# Patient Record
Sex: MALE | Race: WHITE | NOT HISPANIC OR LATINO
[De-identification: names, ages, dates, MRNs, and addresses within clinical notes are randomized per-mention and may not be internally consistent; named-entity substitution may affect disease eponyms.]

---

## 2021-06-15 PROBLEM — Z00.00 ENCOUNTER FOR PREVENTIVE HEALTH EXAMINATION: Status: ACTIVE | Noted: 2021-06-15

## 2021-06-16 ENCOUNTER — APPOINTMENT (OUTPATIENT)
Dept: UROLOGY | Facility: CLINIC | Age: 81
End: 2021-06-16
Payer: MEDICARE

## 2021-06-16 VITALS
BODY MASS INDEX: 26.58 KG/M2 | WEIGHT: 150 LBS | HEART RATE: 61 BPM | SYSTOLIC BLOOD PRESSURE: 151 MMHG | HEIGHT: 63 IN | TEMPERATURE: 98.3 F | DIASTOLIC BLOOD PRESSURE: 61 MMHG

## 2021-06-16 DIAGNOSIS — I10 ESSENTIAL (PRIMARY) HYPERTENSION: ICD-10-CM

## 2021-06-16 DIAGNOSIS — Z87.891 PERSONAL HISTORY OF NICOTINE DEPENDENCE: ICD-10-CM

## 2021-06-16 DIAGNOSIS — I25.718: ICD-10-CM

## 2021-06-16 DIAGNOSIS — Z96.89 PRESENCE OF OTHER SPECIFIED FUNCTIONAL IMPLANTS: ICD-10-CM

## 2021-06-16 PROCEDURE — 99205 OFFICE O/P NEW HI 60 MIN: CPT

## 2021-06-16 RX ORDER — ASPIRIN 81 MG/1
81 TABLET ORAL
Refills: 0 | Status: ACTIVE | COMMUNITY

## 2021-06-16 RX ORDER — LISINOPRIL 40 MG/1
40 TABLET ORAL
Refills: 0 | Status: ACTIVE | COMMUNITY

## 2021-06-16 RX ORDER — CLOPIDOGREL 75 MG/1
75 TABLET, FILM COATED ORAL
Refills: 0 | Status: ACTIVE | COMMUNITY

## 2021-06-16 NOTE — ASSESSMENT
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.

## 2021-06-16 NOTE — REVIEW OF SYSTEMS
[Abdominal Pain] : abdominal pain [Limb Weakness] : limb weakness [Difficulty Walking] : difficulty walking [Negative] : Heme/Lymph

## 2021-06-16 NOTE — HISTORY OF PRESENT ILLNESS
[FreeTextEntry1] : The patient is an 80-year-old gentleman who was seen in the office today for the above.  He underwent open radical retropubic prostatectomy at [facility] for prostate cancer 1995 and then had an artificial sphincter implanted in 2001 at [facility] by Dr. Ro.  He was doing well till a few years ago when he developed urinary incontinence despite the artificial sphincter.  He was being followed by the urologist at [facility].  His wife said she was told that he had an overactive bladder and he was treated with Myrbetriq up to 50 mg daily.  It was unsuccessful and so neuromodulation was proposed.  His wife was inquiring if this failed, was there any other treatment besides Downey drainage.  He had an episode of urinary retention a few months ago with an elevated creatinine and so urethral Downey was inserted.  His creatinine normalized according to his wife.  It was felt that a suprapubic tube would be more comfortable for him so this was done by interventional radiology at [facility] under CT guidance because he had appendectomy many years ago and the bowel was overlying the bladder.  An 8 German pigtail catheter was used.  His wife stated that the urethral Downey was eventually removed.\par \par Past Urological History:\par Artificial sphincter\par \par Urological Family History: Negative

## 2021-06-16 NOTE — PHYSICAL EXAM
[General Appearance - Well Developed] : well developed [General Appearance - Well Nourished] : well nourished [Normal Appearance] : normal appearance [Well Groomed] : well groomed [General Appearance - In No Acute Distress] : no acute distress [Edema] : no peripheral edema [Respiration, Rhythm And Depth] : normal respiratory rhythm and effort [Exaggerated Use Of Accessory Muscles For Inspiration] : no accessory muscle use [Auscultation Breath Sounds / Voice Sounds] : lungs were clear to auscultation bilaterally [Bowel Sounds] : normal bowel sounds [Abdomen Soft] : soft [Abdomen Tenderness] : non-tender [Abdomen Mass (___ Cm)] : no abdominal mass palpated [Costovertebral Angle Tenderness] : no ~M costovertebral angle tenderness [Urethral Meatus] : meatus normal [Penis Abnormality] : normal uncircumcised penis [Urinary Bladder Findings] : the bladder was normal on palpation [Scrotum] : the scrotum was normal [Epididymis] : the epididymides were normal [Testes Tenderness] : no tenderness of the testes [Testes Mass (___cm)] : there were no testicular masses [Anus Abnormality] : the anus and perineum were normal [Normal Station and Gait] : the gait and station were normal for the patient's age [] : no rash [No Focal Deficits] : no focal deficits [Oriented To Time, Place, And Person] : oriented to person, place, and time [Affect] : the affect was normal [Mood] : the mood was normal [Not Anxious] : not anxious [No Palpable Adenopathy] : no palpable adenopathy [FreeTextEntry1] : Prostate absent by history because of radical prostatectomy in 1995

## 2021-06-16 NOTE — LETTER BODY
[Dear  ___] : Dear ~OTIS, [Courtesy Letter:] : I had the pleasure of seeing your patient, [unfilled], in my office today. [Please see my note below.] : Please see my note below. [Consult Closing:] : Thank you very much for allowing me to participate in the care of this patient.  If you have any questions, please do not hesitate to contact me. [Sincerely,] : Sincerely,

## 2022-08-05 ENCOUNTER — APPOINTMENT (OUTPATIENT)
Dept: ORTHOPEDIC SURGERY | Facility: CLINIC | Age: 82
End: 2022-08-05

## 2022-08-05 PROCEDURE — 73564 X-RAY EXAM KNEE 4 OR MORE: CPT | Mod: RT

## 2022-08-05 PROCEDURE — L1833: CPT

## 2022-08-05 PROCEDURE — J3490M: CUSTOM

## 2022-08-05 PROCEDURE — 99213 OFFICE O/P EST LOW 20 MIN: CPT | Mod: 25

## 2022-08-05 PROCEDURE — 20611 DRAIN/INJ JOINT/BURSA W/US: CPT

## 2022-10-07 ENCOUNTER — APPOINTMENT (OUTPATIENT)
Dept: ORTHOPEDIC SURGERY | Facility: CLINIC | Age: 82
End: 2022-10-07

## 2022-11-25 ENCOUNTER — APPOINTMENT (OUTPATIENT)
Dept: ORTHOPEDIC SURGERY | Facility: CLINIC | Age: 82
End: 2022-11-25

## 2022-11-25 VITALS — BODY MASS INDEX: 29.08 KG/M2 | HEIGHT: 62 IN | WEIGHT: 158 LBS

## 2022-11-25 DIAGNOSIS — M70.51 OTHER BURSITIS OF KNEE, RIGHT KNEE: ICD-10-CM

## 2022-11-25 PROCEDURE — 99213 OFFICE O/P EST LOW 20 MIN: CPT | Mod: 25

## 2022-11-25 PROCEDURE — 20611 DRAIN/INJ JOINT/BURSA W/US: CPT | Mod: RT

## 2022-11-25 NOTE — PHYSICAL EXAM
[5___] : hamstring 5[unfilled]/5 [Right] : right knee [All Views] : anteroposterior, lateral, skyline, and anteroposterior standing [Degenerative change] : Degenerative change [] : no calf tenderness [TWNoteComboBox7] : flexion 130 degrees [de-identified] : extension 0 degrees

## 2022-11-25 NOTE — DISCUSSION/SUMMARY
[de-identified] : Patient allowed to gently start resuming activities.\par Discussed change to medication prescription and usage. \par Offered cortisone steroid injection. \par Bracing options discussed with patient. \par Hyaluronic Acid inj pamphlet given to pt. 11/25/2022 \par RE:  YANDY REESEAURELIO \par \par Acct #- [id number] \par Attention:  Nurse Reviewer /Medical Director\par \par I am writing this letter as a medical necessity for HA euflexxa\par Patient has tried analgesics, non-steroid anti-inflammatory agents, \par physical therapy, hot or cold compresses,injections of corticosteroids, etc)  which in combination or by themselves has not worked.\par Based on my patient's condition, I strongly believe that the Hyaluronic aid injections is medically needed.\par  \par Thank you for your time and consideration.   \par 11/25/2022 \par \par  RE:  YANDY DE SOUZA \par \par Acct #- [id number] \par \par \par Attention:  Nurse Reviewer /Medical Director\par \par I am writing this letter as a medical necessity for PT program.\par Patient has tried analgesics, non-steroid anti-inflammatory agents, \par hot or cold compresses,injections of corticosteroids, etc)  which in combination or by themselves has not worked.\par Based on my patient's condition, I strongly believe that the PT is medically needed.\par  \par Thank you for your time and consideration.   \par \par try ice and lido patch\par \par

## 2022-11-25 NOTE — PHYSICAL EXAM
[5___] : hamstring 5[unfilled]/5 [Right] : right knee [All Views] : anteroposterior, lateral, skyline, and anteroposterior standing [Degenerative change] : Degenerative change [] : no calf tenderness [TWNoteComboBox7] : flexion 130 degrees [de-identified] : extension 0 degrees

## 2022-11-25 NOTE — HISTORY OF PRESENT ILLNESS
[10] : 10 [Radiating] : radiating [Constant] : constant [Sleep] : sleep [Nothing helps with pain getting better] : Nothing helps with pain getting better [Stairs] : stairs [Lying in bed] : lying in bed [] : yes [de-identified] : right knee pain , zilretta with temp help, no injuyr, pain when walks, he is on blood thinners [FreeTextEntry1] : Right knee [FreeTextEntry5] : 2 wks.  [FreeTextEntry7] : up/down rt leg [de-identified] : pt would like injection today

## 2022-11-25 NOTE — DISCUSSION/SUMMARY
[de-identified] : Patient allowed to gently start resuming activities.\par Discussed change to medication prescription and usage. \par Offered cortisone steroid injection. \par Bracing options discussed with patient. \par Hyaluronic Acid inj pamphlet given to pt.

## 2022-11-25 NOTE — HISTORY OF PRESENT ILLNESS
[de-identified] : right knee pain 2 wks. Taking tylenol, ambulating with cane. Denies trauma. Here with wife for translating. There are night symptoms. PAin with walking. Taking Plavix.  Prior swelling.  [FreeTextEntry1] : Right knee [FreeTextEntry5] : 2 wks.

## 2022-12-05 ENCOUNTER — APPOINTMENT (OUTPATIENT)
Dept: ORTHOPEDIC SURGERY | Facility: CLINIC | Age: 82
End: 2022-12-05

## 2022-12-05 VITALS — HEIGHT: 62 IN | WEIGHT: 158 LBS | BODY MASS INDEX: 29.08 KG/M2

## 2022-12-05 PROCEDURE — 20610 DRAIN/INJ JOINT/BURSA W/O US: CPT | Mod: RT

## 2022-12-05 PROCEDURE — 99213 OFFICE O/P EST LOW 20 MIN: CPT | Mod: 25

## 2022-12-07 NOTE — HISTORY OF PRESENT ILLNESS
[9] : 9 [Radiating] : radiating [Sharp] : sharp [Physical therapy] : physical therapy [Walking] : walking [Stairs] : stairs [2] : 2 [Euflexxa] : Euflexxa [] : no [FreeTextEntry1] : right knee [FreeTextEntry7] : down the rt leg [de-identified] : Dr. Eid [de-identified] : 11-25-22 [de-identified] : rt knee

## 2022-12-07 NOTE — ASSESSMENT
[FreeTextEntry1] : \par A Large joint injection was performed of the right knee. An injection of Euflexxa, series #2 was used. The indication for this procedure was pain and x-ray evidence of Osteoarthritis on this or prior visit, and patient had decreased mobility in the joint. Risks, benefits and alternatives to procedure were discussed; Risks outlined include but are not limited to infection, sepsis, bleeding, scarring, skin discoloration, temporary increase in pain, syncopal episode, failure to resolve symptoms, allergic reaction, and symptom recurrence. All questions were answered to the patient's apparent satisfaction and informed consent obtained. The area of injection was prepared in a sterile fashion. Prior to injection a 'Time Out' was conducted in accordance with [place] policy and the site and nature of procedure verified with the patient. \par  \par Procedure: \par The injection and aspiration was carried out utilizing sterile technique. The site was prepped with alcohol, betadine, ethyl chloride sprayed topically and sterile technique used.\par  \par (x ) 2cc of Euflexxa \par \par Patient tolerated the procedure well and direct pressure was applied for hemostasis. The patient was reminded of potential post-injection risks including, but not limited to, delayed hypersensitivity reactions and/or infection. Ice tonight to the injection site.\par \par The documentation recorded by the scribe accurately reflects the service I personally performed and the decisions made by me.\par I, Av Elliott, attest that this documentation has been prepared under the direction and in the presence of Provider Bryan Pruitt MD.\par

## 2022-12-07 NOTE — PHYSICAL EXAM
[Right] : right knee [5___] : hamstring 5[unfilled]/5 [] : antalgic [TWNoteComboBox7] : flexion 130 degrees [de-identified] : extension 0 degrees

## 2022-12-12 ENCOUNTER — APPOINTMENT (OUTPATIENT)
Dept: ORTHOPEDIC SURGERY | Facility: CLINIC | Age: 82
End: 2022-12-12

## 2022-12-12 VITALS — BODY MASS INDEX: 29.08 KG/M2 | HEIGHT: 62 IN | WEIGHT: 158 LBS

## 2022-12-12 DIAGNOSIS — M17.11 UNILATERAL PRIMARY OSTEOARTHRITIS, RIGHT KNEE: ICD-10-CM

## 2022-12-12 PROCEDURE — 20610 DRAIN/INJ JOINT/BURSA W/O US: CPT | Mod: RT

## 2022-12-12 PROCEDURE — 99213 OFFICE O/P EST LOW 20 MIN: CPT | Mod: 25

## 2022-12-19 PROBLEM — M17.11 PRIMARY OSTEOARTHRITIS OF RIGHT KNEE: Status: ACTIVE | Noted: 2022-08-05

## 2022-12-19 NOTE — PHYSICAL EXAM
[Right] : right knee [5___] : hamstring 5[unfilled]/5 [] : no calf tenderness [TWNoteComboBox7] : flexion 130 degrees [de-identified] : extension 0 degrees

## 2022-12-19 NOTE — ASSESSMENT
[FreeTextEntry1] : \par A Large joint injection was performed of the right knee. An injection of Euflexxa, series #3 was used. The indication for this procedure was pain and x-ray evidence of Osteoarthritis on this or prior visit, and patient had decreased mobility in the joint. Risks, benefits and alternatives to procedure were discussed; Risks outlined include but are not limited to infection, sepsis, bleeding, scarring, skin discoloration, temporary increase in pain, syncopal episode, failure to resolve symptoms, allergic reaction, and symptom recurrence. All questions were answered to the patient's apparent satisfaction and informed consent obtained. The area of injection was prepared in a sterile fashion. Prior to injection a 'Time Out' was conducted in accordance with [place] policy and the site and nature of procedure verified with the patient. \par  \par Procedure: \par The injection and aspiration was carried out utilizing sterile technique. The site was prepped with alcohol, betadine, ethyl chloride sprayed topically and sterile technique used.\par  \par (x ) 2cc of Euflexxa \par \par Patient tolerated the procedure well and direct pressure was applied for hemostasis. The patient was reminded of potential post-injection risks including, but not limited to, delayed hypersensitivity reactions and/or infection. Ice tonight to the injection site.\par \par The documentation recorded by the scribe accurately reflects the service I personally performed and the decisions made by me.\par I, Av Elliott, attest that this documentation has been prepared under the direction and in the presence of Provider Bryan Pruitt MD.\par

## 2022-12-19 NOTE — HISTORY OF PRESENT ILLNESS
[Radiating] : radiating [Physical therapy] : physical therapy [Walking] : walking [Stairs] : stairs [3] : 3 [Euflexxa] : Euflexxa [7] : 7 [Injection therapy] : injection therapy [] : no [FreeTextEntry1] : right knee [FreeTextEntry7] : down the rt leg [de-identified] : 12-5-22 [de-identified] : rt knee

## 2023-02-10 ENCOUNTER — APPOINTMENT (OUTPATIENT)
Dept: UROLOGY | Facility: CLINIC | Age: 83
End: 2023-02-10
Payer: MEDICARE

## 2023-02-10 VITALS
OXYGEN SATURATION: 95 % | SYSTOLIC BLOOD PRESSURE: 138 MMHG | RESPIRATION RATE: 16 BRPM | TEMPERATURE: 97.7 F | BODY MASS INDEX: 28.52 KG/M2 | DIASTOLIC BLOOD PRESSURE: 58 MMHG | WEIGHT: 155 LBS | HEIGHT: 62 IN | HEART RATE: 77 BPM

## 2023-02-10 DIAGNOSIS — Z85.46 PERSONAL HISTORY OF MALIGNANT NEOPLASM OF PROSTATE: ICD-10-CM

## 2023-02-10 PROCEDURE — 99214 OFFICE O/P EST MOD 30 MIN: CPT

## 2023-02-10 NOTE — REVIEW OF SYSTEMS
[Negative] : Heme/Lymph [Urine Infection (bladder/kidney)] : bladder/kidney infection [Wake up at night to urinate  How many times?  ___] : wakes up to urinate [unfilled] times during the night [Leakage of urine with straining, coughing, laughing] : leakage of urine with straining, coughing, laughing

## 2023-02-27 PROBLEM — Z85.46 HISTORY OF MALIGNANT NEOPLASM OF PROSTATE: Status: RESOLVED | Noted: 2023-02-27 | Resolved: 2023-02-27

## 2023-02-27 PROBLEM — Z85.46 HISTORY OF PROSTATE CANCER: Status: ACTIVE | Noted: 2021-06-16

## 2023-02-27 NOTE — PHYSICAL EXAM
[Normal Appearance] : normal appearance [General Appearance - In No Acute Distress] : no acute distress [FreeTextEntry1] : normal peripheral circulation  [] : no respiratory distress [Abdomen Soft] : soft [Abdomen Tenderness] : non-tender [Normal Station and Gait] : the gait and station were normal for the patient's age [Skin Color & Pigmentation] : normal skin color and pigmentation [No Focal Deficits] : no focal deficits [Oriented To Time, Place, And Person] : oriented to person, place, and time

## 2023-02-27 NOTE — ASSESSMENT
[FreeTextEntry1] : Reviewed records.\par Discussed labs and imaging. \par Creatinine: 1.25(2/7/23). \par Renal and Bladder Ultrasound(1/4/23) and CT scan(1/9/23): bilateral moderate to severe hydroureteronephrosis.\par Post void: 39 ml. \par \par History of Prostate cancer:\par Urinary incontinence:\par Hydroureteronephrosis:\par Discussed concern for high pressure urination. \par Discussed treatment options. Discussed possible indwelling Downey catheter. Patient and wife not keen. \par Recommended to see Dr Cooper Pierson for further management. \par

## 2023-02-27 NOTE — HISTORY OF PRESENT ILLNESS
[FreeTextEntry1] : 82 year old male presents for urinary incontinence. \par Has had worsening urinary incontinence. \par Has history of Prostate cancer, status post Radical prostatectomy and Radiation therapy.\par Status post AUS in 2001 with Dr Ro for urinary incontinence and Interstim with Dr Dimas in 2021. \par Recently treated for urinary tract infection and underwent Cystoscopy with Dr Dimas at [facility]. Per Dr Dimas excellent coaptation, no issue with AUS.

## 2023-03-17 ENCOUNTER — APPOINTMENT (OUTPATIENT)
Dept: UROLOGY | Facility: CLINIC | Age: 83
End: 2023-03-17
Payer: MEDICARE

## 2023-03-17 DIAGNOSIS — R33.9 RETENTION OF URINE, UNSPECIFIED: ICD-10-CM

## 2023-03-17 DIAGNOSIS — R32 UNSPECIFIED URINARY INCONTINENCE: ICD-10-CM

## 2023-03-17 DIAGNOSIS — Z78.9 OTHER SPECIFIED HEALTH STATUS: ICD-10-CM

## 2023-03-17 DIAGNOSIS — N13.30 UNSPECIFIED HYDRONEPHROSIS: ICD-10-CM

## 2023-03-17 DIAGNOSIS — T83.111A BREAKDOWN (MECHANICAL) OF IMPLANTED URINARY SPHINCTER, INITIAL ENCOUNTER: ICD-10-CM

## 2023-03-17 PROCEDURE — 99214 OFFICE O/P EST MOD 30 MIN: CPT

## 2023-03-17 NOTE — REVIEW OF SYSTEMS
[Negative] : Heme/Lymph [Constipation] : constipation [Urine Infection (bladder/kidney)] : bladder/kidney infection [Told you have blood in urine on a urine test] : told blood was present in a urine test [Wake up at night to urinate  How many times?  ___] : wakes up to urinate [unfilled] times during the night [Leakage of urine with urgency] : leakage of urine with urgency [Joint Pain] : joint pain

## 2023-03-19 NOTE — HISTORY OF PRESENT ILLNESS
[FreeTextEntry1] : Patient is a 83 yo M who presents for consultation regarding worsening incontinence for January 2023.\par \par He is referred by Dr Gutierrez.\par \par Complicated history of Prostate cancer, status post Radical prostatectomy and Radiation therapy.\par Status post AUS in 2001 with Dr Ro for urinary incontinence and Interstim with Dr Dimas in 2021.  Of note, he had urinary retention in 2021 and required hadley and SPT placement.  He had tried myrbetriq at that time prior to Interstim.\par Recently treated for urinary tract infection and underwent Cystoscopy with Dr Dimas at [facility]. Per Dr Dimas report, excellent coaptation, no issue with AUS.  AUS cuff cycling appropriately.\par \par He has had chronic hydronephrosis, but Cr 1.26.  He has had recent MRI, CT and US imaging in the past 2 months - bladder wall thickening noted.\par \par

## 2023-03-19 NOTE — ASSESSMENT
[FreeTextEntry1] : Patient is a 83 yo M who presents for worsening incontinence, despite recent cysto showing well coapting AUS cuff.\par Here for another opinion\par D/w pt and family at length potential etiology for his worsening incontinence.  Suspect given his imaging showing chronic hydro and bladder wall thickening that he has impaired compliance and bladder detrusor dysfunction\par D/w pt and family that the AUS device is pressurized to only certain pressure and if he has DO or impaired compliance it can overcome the PRB.  D/w pt family that would need UDS to more fully assess bladder and determine his compliance.  D/w pt family that if confirmed as poor compliance, botox injection may help, vs urinary diversion or augment.  D/w pt family that he has already undergone neurostim.  D/w pt family that I do not perform botox injection but would refer to another specialist\par Pt/family will plan to f/u with their regular urologist at [place]\par F/u PRN\par \par I have spent 35 minutes of time on the encounter including reviewing prior records, discussing with pt the above condition and various treatment options, and documentation.

## 2024-05-20 ENCOUNTER — APPOINTMENT (OUTPATIENT)
Dept: ORTHOPEDIC SURGERY | Facility: CLINIC | Age: 84
End: 2024-05-20
Payer: MEDICARE

## 2024-05-20 VITALS — BODY MASS INDEX: 26.58 KG/M2 | WEIGHT: 150 LBS | HEIGHT: 63 IN

## 2024-05-20 DIAGNOSIS — M48.062 SPINAL STENOSIS, LUMBAR REGION WITH NEUROGENIC CLAUDICATION: ICD-10-CM

## 2024-05-20 PROCEDURE — 99204 OFFICE O/P NEW MOD 45 MIN: CPT

## 2024-05-20 RX ORDER — METHIMAZOLE 5 MG/1
TABLET ORAL
Refills: 0 | Status: DISCONTINUED | COMMUNITY
End: 2024-05-20

## 2024-05-21 NOTE — DISCUSSION/SUMMARY
[de-identified] : The MRI report was discussed and reviewed, although the images were not available. The diagnosis of spinal stenosis and its contribution to his symptoms were explained to the patient and his wife. A surgical solution was considered due to the unsatisfactory response to pain management injections in the past. Risks, benefits and expected outcomes of decompressive laminectomy L3-4 have been explained to the patient. Patient was understanding and in agreement. He will keep his appointment with his cardiologist tomorrow and discuss this matter further. An updated L spine mri was requested to eval for progressive stenosis, last mri performed almost 1 year ago, possible surgical planning mri is needed. F/u after mri.  Cumulative encounter duration exceeded 45 minutes.  Prior to appointment and during encounter with patient extensive medical records were reviewed including but not limited to, hospital records, outpatient records, imaging results, and lab data. During this appointment the patient was examined, diagnoses were discussed and explained in a face to face manner. In addition extensive time was spent reviewing aforementioned diagnostic studies. Counseling including abnormal image results, differential diagnoses, treatment options, risk and benefits, lifestyle changes, current condition, and current medications was performed. Patient's comments, questions, and concerns were addressed and patient verbalized understanding. 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  MOON CLAUDIO Acting as a Scribe for Moon Sanders, attest that this documentation has been prepared under the direction and in the presence of Provider Estuardo Shafer MD.

## 2024-05-21 NOTE — HISTORY OF PRESENT ILLNESS
[Tightness] : tightness [Sitting] : sitting [Standing] : standing [Walking] : walking [Retired] : Work status: retired [de-identified] : 05/20/2024 - The patient is presenting for an initial evaluation of chronic back pain, leg pain, and weakness, accompanied by a hunched forward posture. He brings an MRI from one year ago. He has had injections in his back in the past, which provided slight relief. He reports weakness in the front of his thighs, and standing and walking have become extremely limiting for him. [] : no [FreeTextEntry6] : weakness, crampring, loss complete controll of urine [FreeTextEntry9] : lidocaine patch, naprosyn [de-identified] : pcp, pain mgmt [de-identified] : 40yrs ago [de-identified] : mri l-spine done at [place] 08/2023

## 2024-05-21 NOTE — PHYSICAL EXAM
[Normal Coordination] : normal coordination [Normal DTR UE/LE] : normal DTR UE/LE  [Normal Sensation] : normal sensation [Normal Mood and Affect] : normal mood and affect [Oriented] : oriented [Able to Communicate] : able to communicate [Normal Skin] : normal skin [No Rash] : no rash [No Ulcers] : no ulcers [No Lesions] : no lesions [No obvious lymphadenopathy in areas examined] : no obvious lymphadenopathy in areas examined [Well Developed] : well developed [Peripheral vascular exam is grossly normal] : peripheral vascular exam is grossly normal [No Respiratory Distress] : no respiratory distress [Lungs clear to auscultation bilaterally] : lungs clear to auscultation bilaterally [Normal Bowel Sounds] : normal bowel sounds [Non-Tender] : non-tender [No HSM] : no HSM [No Mass] : no mass [] : clonus not sustained at ankle

## 2024-05-21 NOTE — DATA REVIEWED
[MRI] : MRI [Lumbar Spine] : lumbar spine [Report was reviewed and noted in the chart] : The report was reviewed and noted in the chart [FreeTextEntry1] : I stop paperwork reviewed

## 2024-06-03 ENCOUNTER — APPOINTMENT (OUTPATIENT)
Dept: MRI IMAGING | Facility: CLINIC | Age: 84
End: 2024-06-03
Payer: MEDICARE

## 2024-06-03 ENCOUNTER — TRANSCRIPTION ENCOUNTER (OUTPATIENT)
Age: 84
End: 2024-06-03

## 2024-06-03 PROCEDURE — 72148 MRI LUMBAR SPINE W/O DYE: CPT | Mod: MH

## 2024-06-05 ENCOUNTER — TRANSCRIPTION ENCOUNTER (OUTPATIENT)
Age: 84
End: 2024-06-05

## 2024-07-10 ENCOUNTER — APPOINTMENT (OUTPATIENT)
Dept: ORTHOPEDIC SURGERY | Facility: CLINIC | Age: 84
End: 2024-07-10
Payer: MEDICARE

## 2024-07-10 DIAGNOSIS — M51.26 OTHER INTERVERTEBRAL DISC DISPLACEMENT, LUMBAR REGION: ICD-10-CM

## 2024-07-10 DIAGNOSIS — M48.062 SPINAL STENOSIS, LUMBAR REGION WITH NEUROGENIC CLAUDICATION: ICD-10-CM

## 2024-07-10 PROCEDURE — 99214 OFFICE O/P EST MOD 30 MIN: CPT

## 2024-07-11 PROBLEM — M51.26 LUMBAR DISC HERNIATION: Status: ACTIVE | Noted: 2024-07-11

## 2024-07-30 ENCOUNTER — APPOINTMENT (OUTPATIENT)
Dept: ORTHOPEDIC SURGERY | Facility: HOSPITAL | Age: 84
End: 2024-07-30

## 2024-07-30 PROCEDURE — 63030 LAMOT DCMPRN NRV RT 1 LMBR: CPT | Mod: 50,59,62

## 2024-07-30 PROCEDURE — 63047 LAM FACETEC & FORAMOT LUMBAR: CPT | Mod: 62

## 2024-07-30 PROCEDURE — 63048 LAM FACETEC &FORAMOT EA ADDL: CPT | Mod: 62

## 2024-08-14 ENCOUNTER — APPOINTMENT (OUTPATIENT)
Dept: ORTHOPEDIC SURGERY | Facility: CLINIC | Age: 84
End: 2024-08-14
Payer: MEDICARE

## 2024-08-14 DIAGNOSIS — M48.062 SPINAL STENOSIS, LUMBAR REGION WITH NEUROGENIC CLAUDICATION: ICD-10-CM

## 2024-08-14 PROCEDURE — 99024 POSTOP FOLLOW-UP VISIT: CPT

## 2024-08-15 NOTE — DISCUSSION/SUMMARY
[de-identified] : 83 Y M S/P L2 4 LAMINECTOMY AND DISCECTOMY 7/30/24  Post operative wound care and activity restrictions reinforced. Patient will wean down off of medication's as tolerated. Encouraged to walk daily. Will consider outpatient physical therapy at next visit in four weeks.  Prior to appointment and during encounter with patient extensive medical records were reviewed including but not limited to, hospital records, outpatient records, imaging results, and lab data. During this appointment the patient was examined, diagnoses were discussed and explained in a face to face manner. In addition extensive time was spent reviewing aforementioned diagnostic studies. Counseling including abnormal image results, differential diagnoses, treatment options, risk and benefits, lifestyle changes, current condition, and current medications was performed. Patient's comments, questions, and concerns were addressed and patient verbalized understanding. 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   I, [REAL Salas], certify that the clinical information was reviewed with Dr. Shafer, who was physically present in the office. He agreed with the above plan of care and was available for consultation as necessary during the office visit.

## 2024-08-15 NOTE — PHYSICAL EXAM
[Normal Coordination] : normal coordination [Normal DTR UE/LE] : normal DTR UE/LE  [Normal Sensation] : normal sensation [Normal Mood and Affect] : normal mood and affect [Oriented] : oriented [Able to Communicate] : able to communicate [Normal Skin] : normal skin [No Rash] : no rash [No Ulcers] : no ulcers [No Lesions] : no lesions [No obvious lymphadenopathy in areas examined] : no obvious lymphadenopathy in areas examined [Well Developed] : well developed [Peripheral vascular exam is grossly normal] : peripheral vascular exam is grossly normal [No Respiratory Distress] : no respiratory distress [] : no sero-sanguinous drainage

## 2024-08-15 NOTE — DISCUSSION/SUMMARY
[de-identified] : 83 Y M S/P L2 4 LAMINECTOMY AND DISCECTOMY 7/30/24  Post operative wound care and activity restrictions reinforced. Patient will wean down off of medication's as tolerated. Encouraged to walk daily. Will consider outpatient physical therapy at next visit in four weeks.  Prior to appointment and during encounter with patient extensive medical records were reviewed including but not limited to, hospital records, outpatient records, imaging results, and lab data. During this appointment the patient was examined, diagnoses were discussed and explained in a face to face manner. In addition extensive time was spent reviewing aforementioned diagnostic studies. Counseling including abnormal image results, differential diagnoses, treatment options, risk and benefits, lifestyle changes, current condition, and current medications was performed. Patient's comments, questions, and concerns were addressed and patient verbalized understanding. 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   I, [REAL Salas], certify that the clinical information was reviewed with Dr. Shafer, who was physically present in the office. He agreed with the above plan of care and was available for consultation as necessary during the office visit.

## 2024-08-15 NOTE — HISTORY OF PRESENT ILLNESS
[de-identified] : 08/14/2024 - Patient presents to the office for first postoperative visit. No complaints of constitutional symptoms in terms of fever, chills, nausea vomiting. Tolerating PO diet. Has discontinued Dilaudid today and using Tylenol. Still taking the Robaxin as necessary. He is ambulatory daily with use of a cane. Back pain is slightly improved operatively. Leg symptoms are improved.   The patient is s/p Hemilaminectomy with partial facetectomy and discectomy L2-L3, right. Hemilaminectomy with partial facetectomy and discectomy L2-L3. Decompressive Laminectomy at L4. Decompressive Laminectomy at L5 Date of Surgery: 07/30/2024 Pain:    At Rest: 5/10  With Activity:  5/10    07/10/2024: Patient is here for a follow up visit. He is here to re review MRI results with a family member. Patient reports he is not feeling any better. He reports he cannot stand up and is now walking hunched over.   05/20/2024 - The patient is presenting for an initial evaluation of chronic back pain, leg pain, and weakness, accompanied by a hunched forward posture. He brings an MRI from one year ago. He has had injections in his back in the past, which provided slight relief. He reports weakness in the front of his thighs, and standing and walking have become extremely limiting for him.

## 2024-08-15 NOTE — HISTORY OF PRESENT ILLNESS
[de-identified] : 08/14/2024 - Patient presents to the office for first postoperative visit. No complaints of constitutional symptoms in terms of fever, chills, nausea vomiting. Tolerating PO diet. Has discontinued Dilaudid today and using Tylenol. Still taking the Robaxin as necessary. He is ambulatory daily with use of a cane. Back pain is slightly improved operatively. Leg symptoms are improved.   The patient is s/p Hemilaminectomy with partial facetectomy and discectomy L2-L3, right. Hemilaminectomy with partial facetectomy and discectomy L2-L3. Decompressive Laminectomy at L4. Decompressive Laminectomy at L5 Date of Surgery: 07/30/2024 Pain:    At Rest: 5/10  With Activity:  5/10    07/10/2024: Patient is here for a follow up visit. He is here to re review MRI results with a family member. Patient reports he is not feeling any better. He reports he cannot stand up and is now walking hunched over.   05/20/2024 - The patient is presenting for an initial evaluation of chronic back pain, leg pain, and weakness, accompanied by a hunched forward posture. He brings an MRI from one year ago. He has had injections in his back in the past, which provided slight relief. He reports weakness in the front of his thighs, and standing and walking have become extremely limiting for him.

## 2024-08-30 ENCOUNTER — APPOINTMENT (OUTPATIENT)
Dept: ORTHOPEDIC SURGERY | Facility: CLINIC | Age: 84
End: 2024-08-30
Payer: MEDICARE

## 2024-08-30 VITALS — WEIGHT: 150 LBS | HEIGHT: 63 IN | BODY MASS INDEX: 26.58 KG/M2

## 2024-08-30 DIAGNOSIS — M48.062 SPINAL STENOSIS, LUMBAR REGION WITH NEUROGENIC CLAUDICATION: ICD-10-CM

## 2024-08-30 PROCEDURE — 99024 POSTOP FOLLOW-UP VISIT: CPT

## 2024-08-30 RX ORDER — METHOCARBAMOL 750 MG/1
750 TABLET, FILM COATED ORAL TWICE DAILY
Qty: 60 | Refills: 1 | Status: ACTIVE | COMMUNITY
Start: 2024-08-30 | End: 1900-01-01

## 2024-09-02 NOTE — HISTORY OF PRESENT ILLNESS
[de-identified] : 08/30/2024 - Patient presenting for second post op accompanied by his wife. Notes positive improvement in back pain and ability to stand upright. Still feels itchiness around incisional area. Standing and walking tolerance remains limiting. in house PT has been overall helpful. Indicates pain in the knee and toes. Takes muscle relaxer or Tylenol for pain.   08/14/2024 - Patient presents to the office for first postoperative visit. No complaints of constitutional symptoms in terms of fever, chills, nausea vomiting. Tolerating PO diet. Has discontinued Dilaudid today and using Tylenol. Still taking the Robaxin as necessary. He is ambulatory daily with use of a cane. Back pain is slightly improved operatively. Leg symptoms are improved.   The patient is s/p Hemilaminectomy with partial facetectomy and discectomy L2-L3, right. Hemilaminectomy with partial facetectomy and discectomy L2-L3. Decompressive Laminectomy at L4. Decompressive Laminectomy at L5 Date of Surgery: 07/30/2024 Pain:    At Rest: 5/10  With Activity:  5/10    07/10/2024: Patient is here for a follow up visit. He is here to re review MRI results with a family member. Patient reports he is not feeling any better. He reports he cannot stand up and is now walking hunched over.   05/20/2024 - The patient is presenting for an initial evaluation of chronic back pain, leg pain, and weakness, accompanied by a hunched forward posture. He brings an MRI from one year ago. He has had injections in his back in the past, which provided slight relief. He reports weakness in the front of his thighs, and standing and walking have become extremely limiting for him.

## 2024-09-02 NOTE — HISTORY OF PRESENT ILLNESS
[de-identified] : 08/30/2024 - Patient presenting for second post op accompanied by his wife. Notes positive improvement in back pain and ability to stand upright. Still feels itchiness around incisional area. Standing and walking tolerance remains limiting. in house PT has been overall helpful. Indicates pain in the knee and toes. Takes muscle relaxer or Tylenol for pain.   08/14/2024 - Patient presents to the office for first postoperative visit. No complaints of constitutional symptoms in terms of fever, chills, nausea vomiting. Tolerating PO diet. Has discontinued Dilaudid today and using Tylenol. Still taking the Robaxin as necessary. He is ambulatory daily with use of a cane. Back pain is slightly improved operatively. Leg symptoms are improved.   The patient is s/p Hemilaminectomy with partial facetectomy and discectomy L2-L3, right. Hemilaminectomy with partial facetectomy and discectomy L2-L3. Decompressive Laminectomy at L4. Decompressive Laminectomy at L5 Date of Surgery: 07/30/2024 Pain:    At Rest: 5/10  With Activity:  5/10    07/10/2024: Patient is here for a follow up visit. He is here to re review MRI results with a family member. Patient reports he is not feeling any better. He reports he cannot stand up and is now walking hunched over.   05/20/2024 - The patient is presenting for an initial evaluation of chronic back pain, leg pain, and weakness, accompanied by a hunched forward posture. He brings an MRI from one year ago. He has had injections in his back in the past, which provided slight relief. He reports weakness in the front of his thighs, and standing and walking have become extremely limiting for him.

## 2024-09-02 NOTE — DISCUSSION/SUMMARY
[de-identified] : 84 Y M S/P L2 4 LAMINECTOMY AND DISCECTOMY 7/30/24  Incision appears healed nicely without any lingering sutures which was communicated with the patient and his wife. Post operative wound care and activity restrictions reinforced. Continue medical management with Tylenol and muscle relaxer for symptom control. Encouraged to walk daily. Patient will continue treatment with in house physical therapy, and a referral was provided for outpatient PT which he will transition to in the coming week. F/u 4 wks, XR at next visit.   Prior to appointment and during encounter with patient extensive medical records were reviewed including but not limited to, hospital records, outpatient records, imaging results, and lab data. During this appointment the patient was examined, diagnoses were discussed and explained in a face to face manner. In addition extensive time was spent reviewing aforementioned diagnostic studies. Counseling including abnormal image results, differential diagnoses, treatment options, risk and benefits, lifestyle changes, current condition, and current medications was performed. Patient's comments, questions, and concerns were addressed and patient verbalized understanding. 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   MOON BENNETT Acting as a Scribe for Dr. Hollis PALM, Moon Bennett, attest that this documentation has been prepared under the direction and in the presence of Provider Estuardo Shafer MD.

## 2024-09-02 NOTE — PHYSICAL EXAM
[Normal Coordination] : normal coordination [Normal DTR UE/LE] : normal DTR UE/LE  [Normal Sensation] : normal sensation [Normal Mood and Affect] : normal mood and affect [Able to Communicate] : able to communicate [Oriented] : oriented [Normal Skin] : normal skin [No Rash] : no rash [No Ulcers] : no ulcers [No Lesions] : no lesions [No obvious lymphadenopathy in areas examined] : no obvious lymphadenopathy in areas examined [Well Developed] : well developed [Peripheral vascular exam is grossly normal] : peripheral vascular exam is grossly normal [No Respiratory Distress] : no respiratory distress [] : no sero-sanguinous drainage

## 2024-09-02 NOTE — DISCUSSION/SUMMARY
[de-identified] : 84 Y M S/P L2 4 LAMINECTOMY AND DISCECTOMY 7/30/24  Incision appears healed nicely without any lingering sutures which was communicated with the patient and his wife. Post operative wound care and activity restrictions reinforced. Continue medical management with Tylenol and muscle relaxer for symptom control. Encouraged to walk daily. Patient will continue treatment with in house physical therapy, and a referral was provided for outpatient PT which he will transition to in the coming week. F/u 4 wks, XR at next visit.   Prior to appointment and during encounter with patient extensive medical records were reviewed including but not limited to, hospital records, outpatient records, imaging results, and lab data. During this appointment the patient was examined, diagnoses were discussed and explained in a face to face manner. In addition extensive time was spent reviewing aforementioned diagnostic studies. Counseling including abnormal image results, differential diagnoses, treatment options, risk and benefits, lifestyle changes, current condition, and current medications was performed. Patient's comments, questions, and concerns were addressed and patient verbalized understanding. 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   MOON BENNETT Acting as a Scribe for Dr. Hollis PALM, Moon Bennett, attest that this documentation has been prepared under the direction and in the presence of Provider Estuardo Shafer MD.

## 2024-10-07 ENCOUNTER — APPOINTMENT (OUTPATIENT)
Dept: ORTHOPEDIC SURGERY | Facility: CLINIC | Age: 84
End: 2024-10-07
Payer: MEDICARE

## 2024-10-07 VITALS — BODY MASS INDEX: 26.58 KG/M2 | WEIGHT: 150 LBS | HEIGHT: 63 IN

## 2024-10-07 DIAGNOSIS — M48.062 SPINAL STENOSIS, LUMBAR REGION WITH NEUROGENIC CLAUDICATION: ICD-10-CM

## 2024-10-07 PROCEDURE — 99024 POSTOP FOLLOW-UP VISIT: CPT

## 2024-10-07 RX ORDER — HYDROMORPHONE HYDROCHLORIDE 2 MG/1
2 TABLET ORAL TWICE DAILY
Qty: 60 | Refills: 0 | Status: ACTIVE | COMMUNITY
Start: 2024-10-07 | End: 1900-01-01

## 2024-11-18 ENCOUNTER — APPOINTMENT (OUTPATIENT)
Dept: ORTHOPEDIC SURGERY | Facility: CLINIC | Age: 84
End: 2024-11-18

## 2024-12-09 ENCOUNTER — APPOINTMENT (OUTPATIENT)
Dept: ORTHOPEDIC SURGERY | Facility: CLINIC | Age: 84
End: 2024-12-09
Payer: MEDICARE

## 2024-12-09 VITALS — BODY MASS INDEX: 26.58 KG/M2 | HEIGHT: 63 IN | WEIGHT: 150 LBS

## 2024-12-09 DIAGNOSIS — M48.062 SPINAL STENOSIS, LUMBAR REGION WITH NEUROGENIC CLAUDICATION: ICD-10-CM

## 2024-12-09 PROCEDURE — 72110 X-RAY EXAM L-2 SPINE 4/>VWS: CPT

## 2024-12-09 PROCEDURE — 99214 OFFICE O/P EST MOD 30 MIN: CPT

## 2024-12-16 ENCOUNTER — APPOINTMENT (OUTPATIENT)
Dept: MRI IMAGING | Facility: CLINIC | Age: 84
End: 2024-12-16
Payer: MEDICARE

## 2024-12-16 PROCEDURE — 72148 MRI LUMBAR SPINE W/O DYE: CPT | Mod: MH

## 2025-01-20 ENCOUNTER — APPOINTMENT (OUTPATIENT)
Dept: ORTHOPEDIC SURGERY | Facility: CLINIC | Age: 85
End: 2025-01-20

## 2025-03-03 ENCOUNTER — APPOINTMENT (OUTPATIENT)
Dept: ORTHOPEDIC SURGERY | Facility: CLINIC | Age: 85
End: 2025-03-03
Payer: MEDICARE

## 2025-03-03 VITALS — HEIGHT: 63 IN | WEIGHT: 150 LBS | BODY MASS INDEX: 26.58 KG/M2

## 2025-03-03 DIAGNOSIS — M48.062 SPINAL STENOSIS, LUMBAR REGION WITH NEUROGENIC CLAUDICATION: ICD-10-CM

## 2025-03-03 PROCEDURE — 99214 OFFICE O/P EST MOD 30 MIN: CPT

## 2025-04-28 ENCOUNTER — APPOINTMENT (OUTPATIENT)
Dept: PAIN MANAGEMENT | Facility: CLINIC | Age: 85
End: 2025-04-28
Payer: MEDICARE

## 2025-04-28 VITALS — HEIGHT: 63 IN | BODY MASS INDEX: 25.69 KG/M2 | WEIGHT: 145 LBS

## 2025-04-28 DIAGNOSIS — M47.816 SPONDYLOSIS W/OUT MYELOPATHY OR RADICULOPATHY, LUMBAR REGION: ICD-10-CM

## 2025-04-28 DIAGNOSIS — M96.1 POSTLAMINECTOMY SYNDROME, NOT ELSEWHERE CLASSIFIED: ICD-10-CM

## 2025-04-28 DIAGNOSIS — M54.16 RADICULOPATHY, LUMBAR REGION: ICD-10-CM

## 2025-04-28 DIAGNOSIS — M48.062 SPINAL STENOSIS, LUMBAR REGION WITH NEUROGENIC CLAUDICATION: ICD-10-CM

## 2025-04-28 PROCEDURE — 99204 OFFICE O/P NEW MOD 45 MIN: CPT

## 2025-05-13 ENCOUNTER — APPOINTMENT (OUTPATIENT)
Dept: ORTHOPEDIC SURGERY | Facility: AMBULATORY SURGERY CENTER | Age: 85
End: 2025-05-13
Payer: MEDICARE

## 2025-05-13 PROCEDURE — 62323 NJX INTERLAMINAR LMBR/SAC: CPT

## 2025-06-03 ENCOUNTER — TRANSCRIPTION ENCOUNTER (OUTPATIENT)
Age: 85
End: 2025-06-03

## 2025-06-09 ENCOUNTER — APPOINTMENT (OUTPATIENT)
Dept: PAIN MANAGEMENT | Facility: CLINIC | Age: 85
End: 2025-06-09
Payer: MEDICARE

## 2025-06-09 VITALS — WEIGHT: 140 LBS | BODY MASS INDEX: 25.76 KG/M2 | HEIGHT: 62 IN

## 2025-06-09 PROCEDURE — 99214 OFFICE O/P EST MOD 30 MIN: CPT

## 2025-07-08 ENCOUNTER — APPOINTMENT (OUTPATIENT)
Dept: ORTHOPEDIC SURGERY | Facility: AMBULATORY SURGERY CENTER | Age: 85
End: 2025-07-08
Payer: MEDICARE

## 2025-07-08 PROCEDURE — 64493 INJ PARAVERT F JNT L/S 1 LEV: CPT | Mod: 50

## 2025-07-08 PROCEDURE — 64494 INJ PARAVERT F JNT L/S 2 LEV: CPT | Mod: 50,59

## 2025-08-04 ENCOUNTER — APPOINTMENT (OUTPATIENT)
Dept: PAIN MANAGEMENT | Facility: CLINIC | Age: 85
End: 2025-08-04
Payer: MEDICARE

## 2025-08-04 VITALS — BODY MASS INDEX: 25.58 KG/M2 | HEIGHT: 62 IN | WEIGHT: 139 LBS

## 2025-08-04 DIAGNOSIS — M47.816 SPONDYLOSIS W/OUT MYELOPATHY OR RADICULOPATHY, LUMBAR REGION: ICD-10-CM

## 2025-08-04 DIAGNOSIS — M96.1 POSTLAMINECTOMY SYNDROME, NOT ELSEWHERE CLASSIFIED: ICD-10-CM

## 2025-08-04 DIAGNOSIS — M48.062 SPINAL STENOSIS, LUMBAR REGION WITH NEUROGENIC CLAUDICATION: ICD-10-CM

## 2025-08-04 DIAGNOSIS — M47.812 SPONDYLOSIS W/OUT MYELOPATHY OR RADICULOPATHY, CERVICAL REGION: ICD-10-CM

## 2025-08-04 PROCEDURE — 99214 OFFICE O/P EST MOD 30 MIN: CPT

## 2025-09-09 ENCOUNTER — APPOINTMENT (OUTPATIENT)
Dept: ORTHOPEDIC SURGERY | Facility: AMBULATORY SURGERY CENTER | Age: 85
End: 2025-09-09
Payer: MEDICARE

## 2025-09-09 PROCEDURE — 64494 INJ PARAVERT F JNT L/S 2 LEV: CPT | Mod: 50,59

## 2025-09-09 PROCEDURE — 64493 INJ PARAVERT F JNT L/S 1 LEV: CPT | Mod: 50
